# Patient Record
Sex: MALE | Race: WHITE | URBAN - METROPOLITAN AREA
[De-identification: names, ages, dates, MRNs, and addresses within clinical notes are randomized per-mention and may not be internally consistent; named-entity substitution may affect disease eponyms.]

---

## 2023-11-17 ENCOUNTER — OFFICE VISIT (OUTPATIENT)
Dept: URBAN - METROPOLITAN AREA CLINIC 105 | Facility: CLINIC | Age: 23
End: 2023-11-17
Payer: COMMERCIAL

## 2023-11-17 ENCOUNTER — LAB OUTSIDE AN ENCOUNTER (OUTPATIENT)
Dept: URBAN - METROPOLITAN AREA CLINIC 105 | Facility: CLINIC | Age: 23
End: 2023-11-17

## 2023-11-17 VITALS
TEMPERATURE: 96.4 F | WEIGHT: 204 LBS | SYSTOLIC BLOOD PRESSURE: 114 MMHG | DIASTOLIC BLOOD PRESSURE: 78 MMHG | HEART RATE: 75 BPM | BODY MASS INDEX: 32.78 KG/M2 | HEIGHT: 66 IN

## 2023-11-17 DIAGNOSIS — E66.9 OBESITY (BMI 30-39.9): ICD-10-CM

## 2023-11-17 DIAGNOSIS — K76.0 FATTY LIVER: ICD-10-CM

## 2023-11-17 DIAGNOSIS — K21.00 GASTROESOPHAGEAL REFLUX DISEASE WITH ESOPHAGITIS WITHOUT HEMORRHAGE: ICD-10-CM

## 2023-11-17 DIAGNOSIS — K86.9 LESION OF PANCREAS: ICD-10-CM

## 2023-11-17 DIAGNOSIS — F41.9 ANXIETY DISORDER, UNSPECIFIED: ICD-10-CM

## 2023-11-17 DIAGNOSIS — I51.7 CARDIOMEGALY: ICD-10-CM

## 2023-11-17 DIAGNOSIS — F31.9 BIPOLAR AFFECTIVE DISORDER, REMISSION STATUS UNSPECIFIED: ICD-10-CM

## 2023-11-17 DIAGNOSIS — R74.8 ELEVATED LIVER ENZYMES: ICD-10-CM

## 2023-11-17 DIAGNOSIS — Z83.79 FAMILY HISTORY OF CIRRHOSIS OF LIVER: ICD-10-CM

## 2023-11-17 DIAGNOSIS — F32.A DEPRESSION, UNSPECIFIED: ICD-10-CM

## 2023-11-17 DIAGNOSIS — Z87.74 HISTORY OF TETRALOGY OF FALLOT: ICD-10-CM

## 2023-11-17 DIAGNOSIS — I38 VHD (VALVULAR HEART DISEASE): ICD-10-CM

## 2023-11-17 DIAGNOSIS — I10 ESSENTIAL HYPERTENSION: ICD-10-CM

## 2023-11-17 PROBLEM — 8186001: Status: ACTIVE | Noted: 2023-11-17

## 2023-11-17 PROBLEM — 231504006: Status: ACTIVE | Noted: 2023-11-17

## 2023-11-17 PROBLEM — 162864005: Status: ACTIVE | Noted: 2023-11-17

## 2023-11-17 PROBLEM — 59621000: Status: ACTIVE | Noted: 2023-11-17

## 2023-11-17 PROBLEM — 13746004: Status: ACTIVE | Noted: 2023-11-17

## 2023-11-17 PROBLEM — 161573009: Status: ACTIVE | Noted: 2023-11-17

## 2023-11-17 PROBLEM — 368009: Status: ACTIVE | Noted: 2023-11-17

## 2023-11-17 PROCEDURE — 99204 OFFICE O/P NEW MOD 45 MIN: CPT | Performed by: INTERNAL MEDICINE

## 2023-11-17 RX ORDER — LEVOFLOXACIN 750 MG/1
TAKE 1 TABLET BY MOUTH IN THE MORNING FOR 5 DAYS TABLET, FILM COATED ORAL
Qty: 5 EACH | Refills: 0 | Status: ACTIVE | COMMUNITY

## 2023-11-17 RX ORDER — METOPROLOL SUCCINATE 25 MG/1
TAKE 1/2 (ONE-HALF) TABLET BY MOUTH IN THE MORNING DO NOT CRUSH OR CHEW TABLET, EXTENDED RELEASE ORAL
Qty: 15 EACH | Refills: 0 | Status: ACTIVE | COMMUNITY

## 2023-11-17 RX ORDER — FLUOXETINE 40 MG/1
TAKE 1 CAPSULE BY MOUTH EVERY NIGHT FOR DEPRESSION AND ANXIETY CAPSULE ORAL
Qty: 30 EACH | Refills: 1 | Status: ACTIVE | COMMUNITY

## 2023-11-17 RX ORDER — NYSTATIN 100000 [USP'U]/ML
TAKE 4 ML BY MOUTH 4 TIMES DAILY AND SWISH IN MOUTH AND THEN SWALLOW FOR 7-10 DAYS SUSPENSION ORAL
Qty: 120 MILLILITER | Refills: 0 | Status: ACTIVE | COMMUNITY

## 2023-11-17 RX ORDER — CLONIDINE HYDROCHLORIDE 0.1 MG/1
TAKE 1 TABLET BY MOUTH TWICE DAILY AS NEEDED FOR ANXIETY HOLD FOR BLOOD PRESSURE LESS THAN 90/60 OR HEART RATE LESS THAN 60 TABLET ORAL
Qty: 60 EACH | Refills: 1 | Status: ACTIVE | COMMUNITY

## 2023-11-17 RX ORDER — TRAZODONE HYDROCHLORIDE 50 MG/1
TAKE 1 TABLET BY MOUTH AT BEDTIME TABLET ORAL
Qty: 30 EACH | Refills: 1 | Status: ACTIVE | COMMUNITY

## 2023-11-17 RX ORDER — METRONIDAZOLE 500 MG/1
TAKE 1 TABLET BY MOUTH TWICE DAILY ONCE IN THE MORNING AND ONCE BEFORE BEDTIME FOR 5 DAYS TABLET, FILM COATED ORAL
Qty: 10 EACH | Refills: 0 | Status: ACTIVE | COMMUNITY

## 2023-11-17 RX ORDER — ZIPRASIDONE HYDROCHLORIDE 80 MG/1
TAKE 1 CAPSULE BY MOUTH AT BEDTIME CAPSULE ORAL
Qty: 30 EACH | Refills: 1 | Status: ACTIVE | COMMUNITY

## 2023-11-17 RX ORDER — BENZTROPINE MESYLATE 0.5 MG/1
TAKE 1 TABLET BY MOUTH AT BEDTIME TABLET ORAL
Qty: 30 EACH | Refills: 1 | Status: ACTIVE | COMMUNITY

## 2023-11-17 NOTE — EXAM-PHYSICAL EXAM
no Gen: Alert, oriented, in no distress Heent: no icterus, lips wnl Lungs: bilateral breath sounds CVS: first and second heart sounds Abdomen: full, soft, non tender Ext: no edema Joints: normal joints and symmetry Spine: consistent with age Skin: no rash on exposed areas Neuro: no focal localizing signs

## 2023-11-17 NOTE — HPI-TODAY'S VISIT:
11/17/23 21 yo male with complex cardiac history Referred by DR Fabio Aparicio for elevated liver enzymes and a pancreatic tail lesion incidental finding on a CT chest after hosp admission last month for aspiration PNA CT chest showed ? reflux esophagitis, fatty liver and 3.6 cm pancreatic tail lesion GRandmother has cirrhosis from what is thought to be NAFLD AST was 40, ALT 89, In 2021  and . LIver enzymes also noted elevated in 2016 Pt denies Etoh. w/u so far has included iron, ceruloplasmin, IgG, IgA, celiac and all neg. FErritin slightly elevated , one sexual partner and one tattoo.  Has been transfused in the past. No h/o IVDU. He is reported and endorses overuse of tylenol PM 2-3 years ago with 20-40 tablets at night.

## 2023-12-04 LAB
ACTIN (SMOOTH MUSCLE) ANTIBODY (IGG): <20
HBSAG SCREEN: (no result)
HEP A AB, IGM: (no result)
HEP B CORE AB, IGM: (no result)
HEPATITIS C ANTIBODY: (no result)
HEREDITARY HEMOCHROMATOSIS DNA MUT: (no result)
MITOCHONDRIAL (M2) ANTIBODY: <=20

## 2023-12-29 ENCOUNTER — DASHBOARD ENCOUNTERS (OUTPATIENT)
Age: 23
End: 2023-12-29

## 2024-01-05 ENCOUNTER — OFFICE VISIT (OUTPATIENT)
Dept: URBAN - METROPOLITAN AREA CLINIC 105 | Facility: CLINIC | Age: 24
End: 2024-01-05
Payer: COMMERCIAL

## 2024-01-05 VITALS
TEMPERATURE: 97.2 F | HEIGHT: 66 IN | SYSTOLIC BLOOD PRESSURE: 112 MMHG | BODY MASS INDEX: 32.78 KG/M2 | HEART RATE: 102 BPM | DIASTOLIC BLOOD PRESSURE: 76 MMHG | WEIGHT: 204 LBS

## 2024-01-05 DIAGNOSIS — K21.00 GASTROESOPHAGEAL REFLUX DISEASE WITH ESOPHAGITIS WITHOUT HEMORRHAGE: ICD-10-CM

## 2024-01-05 DIAGNOSIS — F32.A DEPRESSION, UNSPECIFIED: ICD-10-CM

## 2024-01-05 DIAGNOSIS — Z87.74 HISTORY OF TETRALOGY OF FALLOT: ICD-10-CM

## 2024-01-05 DIAGNOSIS — K76.0 FATTY LIVER: ICD-10-CM

## 2024-01-05 DIAGNOSIS — F31.9 BIPOLAR AFFECTIVE DISORDER, REMISSION STATUS UNSPECIFIED: ICD-10-CM

## 2024-01-05 DIAGNOSIS — I51.7 CARDIOMEGALY: ICD-10-CM

## 2024-01-05 DIAGNOSIS — I38 VHD (VALVULAR HEART DISEASE): ICD-10-CM

## 2024-01-05 DIAGNOSIS — R74.8 ELEVATED LIVER ENZYMES: ICD-10-CM

## 2024-01-05 DIAGNOSIS — K86.9 LESION OF PANCREAS: ICD-10-CM

## 2024-01-05 DIAGNOSIS — F41.9 ANXIETY DISORDER, UNSPECIFIED: ICD-10-CM

## 2024-01-05 DIAGNOSIS — Z83.79 FAMILY HISTORY OF CIRRHOSIS OF LIVER: ICD-10-CM

## 2024-01-05 DIAGNOSIS — E66.9 OBESITY (BMI 30-39.9): ICD-10-CM

## 2024-01-05 DIAGNOSIS — I10 ESSENTIAL HYPERTENSION: ICD-10-CM

## 2024-01-05 PROBLEM — 197321007: Status: ACTIVE | Noted: 2023-11-17

## 2024-01-05 PROBLEM — 266433003: Status: ACTIVE | Noted: 2023-11-17

## 2024-01-05 PROCEDURE — 99214 OFFICE O/P EST MOD 30 MIN: CPT | Performed by: INTERNAL MEDICINE

## 2024-01-05 RX ORDER — ZIPRASIDONE HYDROCHLORIDE 80 MG/1
TAKE 1 CAPSULE BY MOUTH AT BEDTIME CAPSULE ORAL
Qty: 30 EACH | Refills: 1 | Status: ACTIVE | COMMUNITY

## 2024-01-05 RX ORDER — LEVOFLOXACIN 750 MG/1
TAKE 1 TABLET BY MOUTH IN THE MORNING FOR 5 DAYS TABLET, FILM COATED ORAL
Qty: 5 EACH | Refills: 0 | Status: ACTIVE | COMMUNITY

## 2024-01-05 RX ORDER — NYSTATIN 100000 [USP'U]/ML
TAKE 4 ML BY MOUTH 4 TIMES DAILY AND SWISH IN MOUTH AND THEN SWALLOW FOR 7-10 DAYS SUSPENSION ORAL
Qty: 120 MILLILITER | Refills: 0 | Status: ACTIVE | COMMUNITY

## 2024-01-05 RX ORDER — FLUOXETINE 40 MG/1
TAKE 1 CAPSULE BY MOUTH EVERY NIGHT FOR DEPRESSION AND ANXIETY CAPSULE ORAL
Qty: 30 EACH | Refills: 1 | Status: ACTIVE | COMMUNITY

## 2024-01-05 RX ORDER — METOPROLOL SUCCINATE 25 MG/1
TAKE 1/2 (ONE-HALF) TABLET BY MOUTH IN THE MORNING DO NOT CRUSH OR CHEW TABLET, EXTENDED RELEASE ORAL
Qty: 15 EACH | Refills: 0 | Status: ACTIVE | COMMUNITY

## 2024-01-05 RX ORDER — METRONIDAZOLE 500 MG/1
TAKE 1 TABLET BY MOUTH TWICE DAILY ONCE IN THE MORNING AND ONCE BEFORE BEDTIME FOR 5 DAYS TABLET, FILM COATED ORAL
Qty: 10 EACH | Refills: 0 | Status: ACTIVE | COMMUNITY

## 2024-01-05 RX ORDER — CLONIDINE HYDROCHLORIDE 0.1 MG/1
TAKE 1 TABLET BY MOUTH TWICE DAILY AS NEEDED FOR ANXIETY HOLD FOR BLOOD PRESSURE LESS THAN 90/60 OR HEART RATE LESS THAN 60 TABLET ORAL
Qty: 60 EACH | Refills: 1 | Status: ACTIVE | COMMUNITY

## 2024-01-05 RX ORDER — BENZTROPINE MESYLATE 0.5 MG/1
TAKE 1 TABLET BY MOUTH AT BEDTIME TABLET ORAL
Qty: 30 EACH | Refills: 1 | Status: ACTIVE | COMMUNITY

## 2024-01-05 RX ORDER — TRAZODONE HYDROCHLORIDE 50 MG/1
TAKE 1 TABLET BY MOUTH AT BEDTIME TABLET ORAL
Qty: 30 EACH | Refills: 1 | Status: ACTIVE | COMMUNITY

## 2024-01-05 NOTE — HPI-TODAY'S VISIT:
11/17/23 21 yo male with complex cardiac history Referred by DR Fabio Aparicio for elevated liver enzymes and a pancreatic tail lesion incidental finding on a CT chest after hosp admission last month for aspiration PNA CT chest showed ? reflux esophagitis, fatty liver and 3.6 cm pancreatic tail lesion GRandmother has cirrhosis from what is thought to be NAFLD AST was 40, ALT 89, In 2021  and . LIver enzymes also noted elevated in 2016 Pt denies Etoh. w/u so far has included iron, ceruloplasmin, IgG, IgA, celiac and all neg. FErritin slightly elevated , one sexual partner and one tattoo.  Has been transfused in the past. No h/o IVDU. He is reported and endorses overuse of tylenol PM 2-3 years ago with 20-40 tablets at night.  1/5/24 Here for f/u visit Feels well Did not get MRI - cost issues - does not know when he will be able to afford it.  Reviewed lab results Discussed so far fatty liver and cause for elevated LFTS. HE knows he needs to loose weight.  Discussed avoidance of hepatotoxic drugs, low dose tylenol if needed.  NO HB symptoms.
